# Patient Record
Sex: FEMALE | Race: BLACK OR AFRICAN AMERICAN | NOT HISPANIC OR LATINO | Employment: STUDENT | URBAN - METROPOLITAN AREA
[De-identification: names, ages, dates, MRNs, and addresses within clinical notes are randomized per-mention and may not be internally consistent; named-entity substitution may affect disease eponyms.]

---

## 2024-01-31 ENCOUNTER — HOSPITAL ENCOUNTER (EMERGENCY)
Facility: CLINIC | Age: 24
Discharge: HOME | End: 2024-02-01
Attending: EMERGENCY MEDICINE | Admitting: EMERGENCY MEDICINE
Payer: COMMERCIAL

## 2024-01-31 ENCOUNTER — NURSE TRIAGE (OUTPATIENT)
Dept: NURSING | Facility: CLINIC | Age: 24
End: 2024-01-31

## 2024-01-31 VITALS
DIASTOLIC BLOOD PRESSURE: 76 MMHG | RESPIRATION RATE: 16 BRPM | OXYGEN SATURATION: 100 % | TEMPERATURE: 97.7 F | SYSTOLIC BLOOD PRESSURE: 119 MMHG | HEART RATE: 94 BPM

## 2024-01-31 DIAGNOSIS — R31.9 URINARY TRACT INFECTION WITH HEMATURIA, SITE UNSPECIFIED: ICD-10-CM

## 2024-01-31 DIAGNOSIS — R31.9 HEMATURIA, UNSPECIFIED TYPE: ICD-10-CM

## 2024-01-31 DIAGNOSIS — N39.0 URINARY TRACT INFECTION WITH HEMATURIA, SITE UNSPECIFIED: ICD-10-CM

## 2024-01-31 LAB
ALBUMIN UR-MCNC: ABNORMAL G/DL
APPEARANCE UR: ABNORMAL
BILIRUB UR QL STRIP: ABNORMAL
COLOR UR AUTO: ABNORMAL
GLUCOSE UR STRIP-MCNC: ABNORMAL MG/DL
HGB UR QL STRIP: ABNORMAL
KETONES UR STRIP-MCNC: ABNORMAL MG/DL
LEUKOCYTE ESTERASE UR QL STRIP: ABNORMAL
NITRATE UR QL: ABNORMAL
PH UR STRIP: ABNORMAL [PH]
RBC URINE: >182 /HPF
SP GR UR STRIP: ABNORMAL
UROBILINOGEN UR STRIP-MCNC: ABNORMAL MG/DL
WBC URINE: >182 /HPF

## 2024-01-31 PROCEDURE — 99284 EMERGENCY DEPT VISIT MOD MDM: CPT | Performed by: EMERGENCY MEDICINE

## 2024-01-31 PROCEDURE — 81025 URINE PREGNANCY TEST: CPT | Performed by: EMERGENCY MEDICINE

## 2024-01-31 PROCEDURE — 81001 URINALYSIS AUTO W/SCOPE: CPT | Performed by: EMERGENCY MEDICINE

## 2024-01-31 PROCEDURE — 99283 EMERGENCY DEPT VISIT LOW MDM: CPT | Performed by: EMERGENCY MEDICINE

## 2024-01-31 NOTE — LETTER
February 1, 2024      To Whom It May Concern:      Teresa Gordon was seen in our Emergency Department today, 02/01/24.  I expect her condition to improve over the next 2 days.  She may return to work/school when improved.    Sincerely,        Sheree Jhaveri MD

## 2024-02-01 LAB
ANION GAP SERPL CALCULATED.3IONS-SCNC: 10 MMOL/L (ref 7–15)
BASOPHILS # BLD AUTO: 0.1 10E3/UL (ref 0–0.2)
BASOPHILS NFR BLD AUTO: 0 %
BUN SERPL-MCNC: 14.6 MG/DL (ref 6–20)
CALCIUM SERPL-MCNC: 9.2 MG/DL (ref 8.6–10)
CHLORIDE SERPL-SCNC: 105 MMOL/L (ref 98–107)
CREAT SERPL-MCNC: 0.91 MG/DL (ref 0.51–0.95)
DEPRECATED HCO3 PLAS-SCNC: 22 MMOL/L (ref 22–29)
EGFRCR SERPLBLD CKD-EPI 2021: >90 ML/MIN/1.73M2
EOSINOPHIL # BLD AUTO: 0.1 10E3/UL (ref 0–0.7)
EOSINOPHIL NFR BLD AUTO: 1 %
ERYTHROCYTE [DISTWIDTH] IN BLOOD BY AUTOMATED COUNT: 13.3 % (ref 10–15)
GLUCOSE SERPL-MCNC: 84 MG/DL (ref 70–99)
HCG UR QL: NEGATIVE
HCT VFR BLD AUTO: 38.6 % (ref 35–47)
HGB BLD-MCNC: 12.9 G/DL (ref 11.7–15.7)
IMM GRANULOCYTES # BLD: 0 10E3/UL
IMM GRANULOCYTES NFR BLD: 0 %
INR PPP: 0.96 (ref 0.85–1.15)
INTERNAL QC OK POCT: NORMAL
LYMPHOCYTES # BLD AUTO: 2.3 10E3/UL (ref 0.8–5.3)
LYMPHOCYTES NFR BLD AUTO: 20 %
MCH RBC QN AUTO: 29.7 PG (ref 26.5–33)
MCHC RBC AUTO-ENTMCNC: 33.4 G/DL (ref 31.5–36.5)
MCV RBC AUTO: 89 FL (ref 78–100)
MONOCYTES # BLD AUTO: 0.7 10E3/UL (ref 0–1.3)
MONOCYTES NFR BLD AUTO: 6 %
NEUTROPHILS # BLD AUTO: 8.8 10E3/UL (ref 1.6–8.3)
NEUTROPHILS NFR BLD AUTO: 73 %
NRBC # BLD AUTO: 0 10E3/UL
NRBC BLD AUTO-RTO: 0 /100
PLATELET # BLD AUTO: 250 10E3/UL (ref 150–450)
POCT KIT EXPIRATION DATE: NORMAL
POCT KIT LOT NUMBER: NORMAL
POTASSIUM SERPL-SCNC: 3.5 MMOL/L (ref 3.4–5.3)
RBC # BLD AUTO: 4.34 10E6/UL (ref 3.8–5.2)
SODIUM SERPL-SCNC: 137 MMOL/L (ref 135–145)
WBC # BLD AUTO: 12 10E3/UL (ref 4–11)

## 2024-02-01 PROCEDURE — 36415 COLL VENOUS BLD VENIPUNCTURE: CPT | Performed by: EMERGENCY MEDICINE

## 2024-02-01 PROCEDURE — 80048 BASIC METABOLIC PNL TOTAL CA: CPT | Performed by: EMERGENCY MEDICINE

## 2024-02-01 PROCEDURE — 250N000013 HC RX MED GY IP 250 OP 250 PS 637: Performed by: EMERGENCY MEDICINE

## 2024-02-01 PROCEDURE — 85610 PROTHROMBIN TIME: CPT | Performed by: EMERGENCY MEDICINE

## 2024-02-01 PROCEDURE — 85025 COMPLETE CBC W/AUTO DIFF WBC: CPT | Performed by: EMERGENCY MEDICINE

## 2024-02-01 RX ORDER — CEFPODOXIME PROXETIL 200 MG/1
200 TABLET, FILM COATED ORAL 2 TIMES DAILY
Qty: 14 TABLET | Refills: 0 | Status: SHIPPED | OUTPATIENT
Start: 2024-02-01

## 2024-02-01 RX ORDER — CEFPODOXIME PROXETIL 200 MG/1
200 TABLET, FILM COATED ORAL ONCE
Status: COMPLETED | OUTPATIENT
Start: 2024-02-01 | End: 2024-02-01

## 2024-02-01 RX ADMIN — CEFPODOXIME PROXETIL 200 MG: 200 TABLET, FILM COATED ORAL at 02:00

## 2024-02-01 ASSESSMENT — ACTIVITIES OF DAILY LIVING (ADL): ADLS_ACUITY_SCORE: 35

## 2024-02-01 NOTE — DISCHARGE INSTRUCTIONS
Your urine shows both blood cells and cells that show sign of infection.     You have a slight elevated White Blood cell count.     Your kidney function is normal.  Your electrolytes are normal.     Please take the antibiotics as prescribed.     Return to the ER if you develop back pain, fever, or vomiting.     Please make an appointment to follow up with Primary Care - [facility] (phone: [phone]) and Urology Clinic (phone: [phone]) in 2-3 days even if entirely better.

## 2024-02-01 NOTE — ED TRIAGE NOTES
Pt started having blood in urine two hours ago, pain with urination.     Triage Assessment (Adult)       Row Name 01/31/24 4584          Triage Assessment    Airway WDL WDL        Respiratory WDL    Respiratory WDL WDL        Skin Circulation/Temperature WDL    Skin Circulation/Temperature WDL WDL        Cardiac WDL    Cardiac WDL WDL        Peripheral/Neurovascular WDL    Peripheral Neurovascular WDL WDL        Cognitive/Neuro/Behavioral WDL    Cognitive/Neuro/Behavioral WDL WDL

## 2024-02-01 NOTE — ED PROVIDER NOTES
"ED Provider Note  [facility]      History     Chief Complaint   Patient presents with    Hematuria     HPI  Teresa Gordon is a 23 year old female who presents to the emergency department seeking evaluation of hematuria that onset around 8:30pm tonight. She is unaware of any possible triggers surrounding the onset of her hematuria. She states that before 8:30pm she had been urinating normally. She describes her urine as being \"pure red\". She is not sexually active. No vaginal discharge. She also notes experiencing burning after having voided her bladder as well as extreme urgency before urinating. She denies any abdominal or back pain. No recent medication changes, but she does state that she is on medication for frequent urination. She is unsure of what medication she takes. She notes being nauseous x1 week.      Past Medical History  No past medical history on file.  No past surgical history on file.  No current outpatient medications on file.    No Known Allergies  Family History  No family history on file.  Social History       Past medical history, past surgical history, medications, allergies, family history, and social history were reviewed with the patient. No additional pertinent items.      A medically appropriate review of systems was performed with pertinent positives and negatives noted in the HPI, and all other systems negative.    Physical Exam   BP: 119/76  Pulse: 94  Temp: 97.7  F (36.5  C)  Resp: 16  SpO2: 100 %  Physical Exam  Constitutional:       General: She is not in acute distress.     Appearance: She is well-developed. She is not ill-appearing, toxic-appearing or diaphoretic.   HENT:      Head: Normocephalic and atraumatic.   Cardiovascular:      Rate and Rhythm: Normal rate and regular rhythm.      Heart sounds: Normal heart sounds.   Pulmonary:      Effort: Pulmonary effort is normal. No respiratory distress.      Breath sounds: Normal breath " sounds.   Abdominal:      General: There is no distension.      Palpations: Abdomen is soft. There is no mass.      Tenderness: There is no abdominal tenderness. There is no rebound.      Hernia: No hernia is present.   Musculoskeletal:         General: No tenderness.      Cervical back: Normal range of motion.   Skin:     General: Skin is warm and dry.   Neurological:      Mental Status: She is alert and oriented to person, place, and time.   Psychiatric:         Behavior: Behavior normal.         Thought Content: Thought content normal.           ED Course, Procedures, & Data      Procedures       Results for orders placed or performed during the hospital encounter of 01/31/24   UA with Microscopic     Status: Abnormal   Result Value Ref Range    Color Urine Red (A) Colorless, Straw, Light Yellow, Yellow    Appearance Urine Bloody (A) Clear    Glucose Urine      Bilirubin Urine      Ketones Urine      Specific Gravity Urine      Blood Urine      pH Urine      Protein Albumin Urine      Urobilinogen Urine      Nitrite Urine      Leukocyte Esterase Urine      RBC Urine >182 (H) <=2 /HPF    WBC Urine >182 (H) <=5 /HPF   Basic metabolic panel     Status: Normal   Result Value Ref Range    Sodium 137 135 - 145 mmol/L    Potassium 3.5 3.4 - 5.3 mmol/L    Chloride 105 98 - 107 mmol/L    Carbon Dioxide (CO2) 22 22 - 29 mmol/L    Anion Gap 10 7 - 15 mmol/L    Urea Nitrogen 14.6 6.0 - 20.0 mg/dL    Creatinine 0.91 0.51 - 0.95 mg/dL    GFR Estimate >90 >60 mL/min/1.73m2    Calcium 9.2 8.6 - 10.0 mg/dL    Glucose 84 70 - 99 mg/dL   INR     Status: Normal   Result Value Ref Range    INR 0.96 0.85 - 1.15   CBC with platelets and differential     Status: Abnormal   Result Value Ref Range    WBC Count 12.0 (H) 4.0 - 11.0 10e3/uL    RBC Count 4.34 3.80 - 5.20 10e6/uL    Hemoglobin 12.9 11.7 - 15.7 g/dL    Hematocrit 38.6 35.0 - 47.0 %    MCV 89 78 - 100 fL    MCH 29.7 26.5 - 33.0 pg    MCHC 33.4 31.5 - 36.5 g/dL    RDW 13.3 10.0 -  15.0 %    Platelet Count 250 150 - 450 10e3/uL    % Neutrophils 73 %    % Lymphocytes 20 %    % Monocytes 6 %    % Eosinophils 1 %    % Basophils 0 %    % Immature Granulocytes 0 %    NRBCs per 100 WBC 0 <1 /100    Absolute Neutrophils 8.8 (H) 1.6 - 8.3 10e3/uL    Absolute Lymphocytes 2.3 0.8 - 5.3 10e3/uL    Absolute Monocytes 0.7 0.0 - 1.3 10e3/uL    Absolute Eosinophils 0.1 0.0 - 0.7 10e3/uL    Absolute Basophils 0.1 0.0 - 0.2 10e3/uL    Absolute Immature Granulocytes 0.0 <=0.4 10e3/uL    Absolute NRBCs 0.0 10e3/uL   hCG qual urine POCT     Status: Normal   Result Value Ref Range    HCG Qual Urine Negative Negative    Internal QC Check POCT Valid Valid    POCT Kit Lot Number [id number]     POCT Kit Expiration Date 2025-07-27    CBC with platelets differential     Status: Abnormal    Narrative    The following orders were created for panel order CBC with platelets differential.  Procedure                               Abnormality         Status                     ---------                               -----------         ------                     CBC with platelets and d...[[id number]]  Abnormal            Final result                 Please view results for these tests on the individual orders.     Medications   cefpodoxime (VANTIN) tablet 200 mg (has no administration in time range)                    Results for orders placed or performed during the hospital encounter of 01/31/24   UA with Microscopic     Status: Abnormal   Result Value Ref Range    Color Urine Red (A) Colorless, Straw, Light Yellow, Yellow    Appearance Urine Bloody (A) Clear    Glucose Urine      Bilirubin Urine      Ketones Urine      Specific Gravity Urine      Blood Urine      pH Urine      Protein Albumin Urine      Urobilinogen Urine      Nitrite Urine      Leukocyte Esterase Urine      RBC Urine >182 (H) <=2 /HPF    WBC Urine >182 (H) <=5 /HPF   CBC with platelets differential     Status: None (In process)    Narrative    The following  orders were created for panel order CBC with platelets differential.  Procedure                               Abnormality         Status                     ---------                               -----------         ------                     CBC with platelets and d...[[id number]]                      In process                   Please view results for these tests on the individual orders.     Medications - No data to display  Labs Ordered and Resulted from Time of ED Arrival to Time of ED Departure   ROUTINE UA WITH MICROSCOPIC - Abnormal       Result Value    Color Urine Red (*)     Appearance Urine Bloody (*)     Glucose Urine        Bilirubin Urine        Ketones Urine        Specific Gravity Urine        Blood Urine        pH Urine        Protein Albumin Urine        Urobilinogen Urine        Nitrite Urine        Leukocyte Esterase Urine        RBC Urine >182 (*)     WBC Urine >182 (*)    BASIC METABOLIC PANEL   INR   CBC WITH PLATELETS AND DIFFERENTIAL   HCG QUALITATIVE URINE POCT     No orders to display          Critical care was not performed.     Medical Decision Making  The patient's presentation was of moderate complexity (an undiagnosed new problem with uncertain diagnosis).    The patient's evaluation involved:  ordering and/or review of 2 test(s) in this encounter (cbc, cmp, UA, UCG)    The patient's management necessitated moderate risk (prescription drug management including medications given in the ED).    Assessment & Plan    Patient is a young 23-year-old female with a history of some urinary urgency that she takes medications at home for presents to the ER due to hematuria.  Symptoms started this evening.  Patient here is a UA that shows positive RBCs and positive WBCs.  Patient has a mild leukocytosis.  Remainder of labs including kidney function platelets and hemoglobin are stable.  Recommended to treat for a UTI as this seems to be the likely cause of this worsening symptoms.  Patient has  no signs of pyelonephritis.  She will be given her first oral dose of antibiotics here and will be discharged home with a 7-day course.  Patient to follow-up with PCP and urology as an outpatient if symptoms persist.    I have reviewed the nursing notes. I have reviewed the findings, diagnosis, plan and need for follow up with the patient.    New Prescriptions    No medications on file       Final diagnoses:   Hematuria, unspecified type   Urinary tract infection with hematuria, site unspecified   I, Agustin Hodgson, am serving as a trained medical scribe to document services personally performed by Sheree Jhaveri MD, based on the provider's statements to me.     I, Sheree Jhaveri MD, was physically present and have reviewed and verified the accuracy of this note documented by Agustin Hodgson.      Sheree Jhaveri MD  [facility] EMERGENCY DEPARTMENT  1/31/2024     Sheree Jhaveri MD  02/01/24 0139

## 2024-02-01 NOTE — TELEPHONE ENCOUNTER
"Nurse Triage SBAR    Is this a 2nd Level Triage? NO    Situation: Hematuria    Background: :na    Assessment: Patient reports that she has voided \"pure blood X2\" this evening. She reports a strong urge to urinate, but only able to void a few drops other than the blood. She denies severe weakness, dizziness, or injury to abdomen/back/genitals.    Protocol Recommended Disposition:   According to the protocol, patient should go to ED now.  Care advice given. Patient verbalizes understanding and agrees with plan of care. Patient plans to go to [place] ED.    Priscila Boykin RN  01/31/24 10:39 PM  [facility] Nurse Advisor    Reason for Disposition   [1] Unable to urinate (or only a few drops) > 4 hours AND [2] bladder feels very full (e.g., palpable bladder or strong urge to urinate)    Additional Information   Negative: Shock suspected (e.g., cold/pale/clammy skin, too weak to stand, low BP, rapid pulse)   Negative: Sounds like a life-threatening emergency to the triager   Negative: Urinary catheter, questions about   Negative: Urinary catheter and spinal cord injury, questions about   Negative: Hospice patient with urinary catheter   Negative: Recent back or abdominal injury   Negative: Recent genital injury    Protocols used: Urine - Blood In-A-    "

## 2024-09-07 ENCOUNTER — HOSPITAL ENCOUNTER (EMERGENCY)
Facility: CLINIC | Age: 24
Discharge: HOME | End: 2024-09-07
Attending: EMERGENCY MEDICINE | Admitting: EMERGENCY MEDICINE
Payer: COMMERCIAL

## 2024-09-07 VITALS
RESPIRATION RATE: 16 BRPM | DIASTOLIC BLOOD PRESSURE: 68 MMHG | SYSTOLIC BLOOD PRESSURE: 100 MMHG | TEMPERATURE: 98.4 F | HEART RATE: 95 BPM | OXYGEN SATURATION: 99 %

## 2024-09-07 DIAGNOSIS — R45.851 SUICIDAL IDEATION: ICD-10-CM

## 2024-09-07 PROBLEM — F41.1 GAD (GENERALIZED ANXIETY DISORDER): Status: ACTIVE | Noted: 2024-09-07

## 2024-09-07 PROBLEM — F32.A DEPRESSION, UNSPECIFIED DEPRESSION TYPE: Status: ACTIVE | Noted: 2024-09-07

## 2024-09-07 LAB — ALCOHOL BREATH TEST: 0.15 (ref 0–0.01)

## 2024-09-07 PROCEDURE — 99283 EMERGENCY DEPT VISIT LOW MDM: CPT | Performed by: EMERGENCY MEDICINE

## 2024-09-07 PROCEDURE — 82075 ASSAY OF BREATH ETHANOL: CPT | Performed by: EMERGENCY MEDICINE

## 2024-09-07 PROCEDURE — 99284 EMERGENCY DEPT VISIT MOD MDM: CPT | Performed by: EMERGENCY MEDICINE

## 2024-09-07 ASSESSMENT — ACTIVITIES OF DAILY LIVING (ADL)
ADLS_ACUITY_SCORE: 35

## 2024-09-07 ASSESSMENT — COLUMBIA-SUICIDE SEVERITY RATING SCALE - C-SSRS
1. IN THE PAST MONTH, HAVE YOU WISHED YOU WERE DEAD OR WISHED YOU COULD GO TO SLEEP AND NOT WAKE UP?: YES
5. HAVE YOU STARTED TO WORK OUT OR WORKED OUT THE DETAILS OF HOW TO KILL YOURSELF? DO YOU INTEND TO CARRY OUT THIS PLAN?: YES
2. HAVE YOU ACTUALLY HAD ANY THOUGHTS OF KILLING YOURSELF IN THE PAST MONTH?: YES
3. HAVE YOU BEEN THINKING ABOUT HOW YOU MIGHT KILL YOURSELF?: YES
6. HAVE YOU EVER DONE ANYTHING, STARTED TO DO ANYTHING, OR PREPARED TO DO ANYTHING TO END YOUR LIFE?: NO
4. HAVE YOU HAD THESE THOUGHTS AND HAD SOME INTENTION OF ACTING ON THEM?: NO

## 2024-09-07 NOTE — ED TRIAGE NOTES
To ED via EMS for evaluation of SI. Pt has hx of depression on Zoloft. Endorses ETOH use tonight. States she was having thoughts of taking her entire bottle of Zoloft.      Triage Assessment (Adult)       Row Name 09/07/24 0027          Triage Assessment    Airway WDL WDL        Respiratory WDL    Respiratory WDL WDL        Skin Circulation/Temperature WDL    Skin Circulation/Temperature WDL WDL        Cardiac WDL    Cardiac WDL WDL

## 2024-09-07 NOTE — DISCHARGE INSTRUCTIONS
Appointments    Date: Wednesday, 9/11/2024  Time: 4:00 pm - 4:55 pm  Provider: Elle Weaver MA  [facility]  Location: [facility], [address] [place] [address]  Phone: [phone]  Type: Teletherapy    Patient Instructions  Telehealth/Virtual services only. No in-person services. New patients are contacted via phone/email by CDS office managers (to confirm information), before 1st appointment. Electronic device w/video capabilities required for services. New patients have e-paperwork to complete prior to 1st appointment (access to e-docs given by CDS  during intake). Call CDS w/questions [phone] or email [email or web address]      It is your responsibility to contact your insurance company directly to verify coverage, eligibility, payment, and benefit information for any appointments or referrals listed above.    [facility] maintains an extensive network of licensed behavioral health providers to connect patients with the services they need. We do not charge providers a fee to participate in our referral network. We match patients with providers based on a patient's specific treatment needs, insurance coverage, and location. Our first effort will be to refer you to a provider within your care system and will utilize providers outside your care system as needed.

## 2024-09-07 NOTE — ED PROVIDER NOTES
[place] EMERGENCY DEPARTMENT ([facility])    9/07/24         History     Chief Complaint   Patient presents with    Suicidal     HPI  Teresa Gordon is a 23 year old female who with no relevant PMH as per Epic presents to the ED due to suicidal ideation.     As per triage note, patient presents to ED via EMS for evaluation of SI. Patient has hx of depression on Zoloft. She endorses ETOH use tonight. She states she was having thoughts of taking an entire bottle of Zoloft.  She has not tried to hurt herself in the past but is thought about it.  She also thought about jumping off a bridge.  No hallucinations or voices.  She had 5 or 6 drinks tonight.  Denies other drug use.    Past Medical History  No past medical history on file.  No past surgical history on file.  cefpodoxime (VANTIN) 200 MG tablet      No Known Allergies  Family History  No family history on file.  Social History       Past medical history, past surgical history, medications, allergies, family history, and social history were reviewed with the patient. No additional pertinent items.   A complete review of systems was performed with pertinent positives and negatives noted in the HPI, and all other systems negative.    Physical Exam      Physical Exam  Physical Exam   Constitutional: oriented to person, place, and time. appears well-developed and well-nourished.   HENT:   Head: Normocephalic and atraumatic.   Neck: Normal range of motion.   Pulmonary/Chest: Effort normal. No respiratory distress.   Cardiac: No murmurs, rubs, gallops. RRR.  Abdominal: Abdomen soft, nontender, nondistended. No rebound tenderness.  MSK: Long bones without deformity or evidence of trauma  Neurological: alert and oriented to person, place, and time.   Skin: Skin is warm and dry.   Psychiatric:  normal mood and affect.  behavior is normal. Thought content normal.       ED Course, Procedures, & Data      Procedures            No results found for any visits on  09/07/24.  Medications - No data to display  Labs Ordered and Resulted from Time of ED Arrival to Time of ED Departure - No data to display  No orders to display          Critical care was not performed.     Medical Decision Making  The patient's presentation was of high complexity (an acute health issue posing potential threat to life or bodily function).    The patient's evaluation involved:  independent interpretation of testing performed by another health professional (mental health )    The patient's management necessitated further care after sign-out to Dr. Martinez (see their note for further management).    Assessment & Plan    MDM  Patient here without intoxication and suicidal ideation with a plan.  Will have behavioral assessment done prior to final disposition.  I would hold this patient until having an assessment done.  She will likely need to sober prior to an assessment.    I have reviewed the nursing notes. I have reviewed the findings, diagnosis, plan and need for follow up with the patient.    New Prescriptions    No medications on file       Final diagnoses:   Suicidal ideation       Damian Patel  [facility] EMERGENCY DEPARTMENT  9/7/2024     Damian Patel MD  09/07/24 0046

## 2024-09-07 NOTE — ED PROVIDER NOTES
Dr. Naomi Rock notified of positive MSSA, order received for mupirocin x 1 in pre-op. Patient received in sign-out from prior attending     Teresa BORJAS Heidi is a 23 year old who presenting to the ED for SI- thoughts of jumping of bidge    Plan is to await DEC evaluation     DEV laury called in to me at 450     - multiple social stressors.  Concerned she is inconvenient to her friends.   - takes zoloft, compliant  - denies other drugs except occasional etoh   - open and interested in therapy,   - currently denies SI, but endorses prior thoughts.   - able to contract for safety.       Dispo - putpt followup plan with therapy scheduled as per AVS  SERP given and discussed.     /76   Pulse 95   Temp 98.2  F (36.8  C) (Oral)   Resp 16   SpO2 98%     Patient has been HDS without issue since sign-out received.          Isaac Martinez MD  09/07/24 1854

## 2024-09-07 NOTE — CONSULTS
"Diagnostic Evaluation Consultation  Crisis Assessment    Patient Name: Teresa Gordon  Age:  23 year old  Legal Sex: female  Gender Identity: female  Pronouns:   Race: Black or   Ethnicity: Not  or   Language: English      Patient was assessed: Virtual: [employer]   Crisis Assessment Start Date: 09/07/24  Crisis Assessment Start Time: 0415  Crisis Assessment Stop Time: 0448  Patient location: [facility] EMERGENCY DEPARTMENT                             [id number]    Referral Data and Chief Complaint  Teresa Gordon presents to the ED via EMS. Patient is presenting to the ED for the following concerns: Worsening psychosocial stress, Suicidal ideation, Depression.   Factors that make the mental health crisis life threatening or complex are:  Pt struggling to feel connected to her support system and ongoing issues with feelings of negative self worth..      Informed Consent and Assessment Methods  Explained the crisis assessment process, including applicable information disclosures and limits to confidentiality, assessed understanding of the process, and obtained consent to proceed with the assessment.  Assessment methods included conducting a formal interview with patient, review of medical records, collaboration with medical staff, and obtaining relevant collateral information from family and community providers when available.  : done     Patient response to interventions: acceptance expressed, verbalizes understanding  Coping skills were attempted to reduce the crisis:  pt reaching out to friend for support     History of the Crisis   Patient is a 23-year-old female with a history of depression and anxiety who comes in the hospital brought in by ambulance due to concerns of suicidal ideation.  Patient states that lately she \"has been having a hard time\" feeling that it has been difficult for her to feel connected to her friend group and wishing to have more support from them but " "at times feeling as though they are not available.  Patient was drinking yesterday and began to develop thoughts of suicide including a plan to jump off a bridge or overdose on her medication.  She ended up sharing this information with a friend who then called 911 for the patient. Upon coming to the ED pt had a ALTHEA of .152.    Patient states that while in the hospital she feels \"a bit better\" and denies any current plans of suicide at this time.  She feels that often in life she does not feel good enough where she continues to experience depressive symptoms including feeling helpless, becoming tearful, sadness and reduced energy levels.  She also reports struggling with anxiety \"my whole life\" where she finds herself worried about everything and often over thinking things.  She feels that she gets adequate support from her family and has been trying to be more open and honest with them moving forward.    Brief Psychosocial History  Family:  Single, Children no  Support System:  Friend, Parent(s)  Employment Status:  unemployed  Source of Income:  none  Financial Environmental Concerns:  none  Current Hobbies:  group/social activities, television/movies/videos  Barriers in Personal Life:       Significant Clinical History  Current Anxiety Symptoms:  excessive worry, racing thoughts, anxious  Current Depression/Trauma:  sense of doom, crying or feels like crying, hopelessness, sadness, helplessness, thoughts of death/suicide, low self esteem  Current Somatic Symptoms:  excessive worry, anxious, racing thoughts  Current Psychosis/Thought Disturbance:     Current Eating Symptoms:  loss of appetite  Chemical Use History:  Alcohol: Other (comments) (drank twice this past week; prior to this has not drank all summer)  Last Use:: 09/06/24  Benzodiazepines: None  Opiates: None  Cocaine: None  Marijuana: None  Other Use: None   Past diagnosis:  Depression, Anxiety Disorder  Family history:  Depression  Past treatment:  " Individual therapy, Primary Care  Details of most recent treatment:  Pt currently not established with any outpatient providers. Last saw a therapist in August 2024. Pt is uncertain as to why she stopped going. Pt has no previous inpatient admissions.  Other relevant history:          Collateral Information  Is there collateral information: No     Collateral information name, relationship, phone number:       What happened today:       What is different about patient's functioning:       Concern about alcohol/drug use:      What do you think the patient needs:      Has patient made comments about wanting to kill themselves/others:      If d/c is recommended, can they take part in safety/aftercare planning:       Additional collateral information:        Risk Assessment  [place] Suicide Severity Rating Scale Full Clinical Version:  Suicidal Ideation  Q1 Wish to be Dead (Lifetime): Yes  Q2 Non-Specific Active Suicidal Thoughts (Lifetime): Yes  3. Active Suicidal Ideation with any Methods (Not Plan) Without Intent to Act (Lifetime): Yes  Q4 Active Suicidal Ideation with Some Intent to Act, Without Specific Plan (Lifetime): No  Q5 Active Suicidal Ideation with Specific Plan and Intent (Lifetime): No  Q6 Suicide Behavior (Lifetime): no     Suicidal Behavior (Lifetime)  Actual Attempt (Lifetime): No  Has subject engaged in non-suicidal self-injurious behavior? (Lifetime): No  Interrupted Attempts (Lifetime): No  Aborted or Self-Interrupted Attempt (Lifetime): No  Preparatory Acts or Behavior (Lifetime): No    [place] Suicide Severity Rating Scale Recent:   Suicidal Ideation (Recent)  Q1 Wished to be Dead (Past Month): yes  Q2 Suicidal Thoughts (Past Month): yes  Q3 Suicidal Thought Method: yes  Q4 Suicidal Intent without Specific Plan: no  Q5 Suicide Intent with Specific Plan: no  Level of Risk per Screen: moderate risk  Intensity of Ideation (Recent)  Most Severe Ideation Rating (Past 1 Month): 4  Frequency (Past 1  Month): Daily or almost daily  Duration (Past 1 Month): 1-4 hours/a lot of time  Controllability (Past 1 Month): Can control thoughts with a lot of difficulty  Deterrents (Past 1 Month): Deterrents definitely did not stop you  Reasons for Ideation (Past 1 Month): Completely to end or stop the pain (You couldn't go on living with the pain or how you were feeling)  Suicidal Behavior (Recent)  Actual Attempt (Past 3 Months): No  Has subject engaged in non-suicidal self-injurious behavior? (Past 3 Months): No  Interrupted Attempts (Past 3 Months): No  Aborted or Self-Interrupted Attempt (Past 3 Months): No  Preparatory Acts or Behavior (Past 3 Months): No    Environmental or Psychosocial Events: unemployment/underemployment, helplessness/hopelessness, challenging interpersonal relationships  Protective Factors: Protective Factors: lives in a responsibly safe and stable environment, sense of importance of health and wellness    Does the patient have thoughts of harming others? Feels Like Hurting Others: no  Previous Attempt to Hurt Others: no  Is the patient engaging in sexually inappropriate behavior?: no    Is the patient engaging in sexually inappropriate behavior?  no        Mental Status Exam   Affect: Blunted  Appearance: Appropriate  Attention Span/Concentration: Attentive  Eye Contact: Engaged    Fund of Knowledge: Appropriate   Language /Speech Content: Fluent  Language /Speech Volume: Normal  Language /Speech Rate/Productions: Normal  Recent Memory: Intact  Remote Memory: Intact  Mood: Normal  Orientation to Person: Yes   Orientation to Place: Yes  Orientation to Time of Day: Yes  Orientation to Date: Yes     Situation (Do they understand why they are here?): Yes  Psychomotor Behavior: Normal  Thought Content: Clear, Other (please comment) (passive Si, no plan or intent)  Thought Form: Intact     Mini-Cog Assessment  Number of Words Recalled:    Clock-Drawing Test:     Three Item Recall:    Mini-Cog Total  Score:       Medication  Psychotropic medications:   Medication Orders - Psychiatric (From admission, onward)      None             Current Care Team  Patient Care Team:  No Ref-Primary, Physician as PCP - General    Diagnosis  Patient Active Problem List   Diagnosis Code    Depression, unspecified depression type F32.A    BRIELLE (generalized anxiety disorder) F41.1       Primary Problem This Admission  Active Hospital Problems    Depression, unspecified depression type      BRIELLE (generalized anxiety disorder)        Clinical Summary and Substantiation of Recommendations   Patient comes into the ED due to concerns of suicidal ideation with verbalizing yesterday having thoughts of jumping off a bridge or overdosing on medication.  Patient believes that alcohol was somewhat to blame for these statements and currently denies any plans for suicide.  Patient feels that life as a whole has been difficult including navigating social relationships with her friend group and trying to balance being a student in college.  Patient is currently unemployed and feels that she also needs to put forth more effort in school.  She feels that she lacks a sense of direction and at times struggling with feelings of worthlessness.  At times patient had difficulty articulating specific concerns that she was experiencing when  was trying to probe for further information.  Patient was open and willing to follow up with therapy along with resuming her Zoloft medication.  Patient feels that they are able to maintain safety upon discharge from the hospital.  Discussed case with attending doctor who is in agreement with plan.            Patient coping skills attempted to reduce the crisis:  pt reaching out to friend for support    Disposition  Recommended disposition: Individual Therapy        Reviewed case and recommendations with attending provider. Attending Name: Dr. Martinez       Attending concurs with disposition: yes       Patient  and/or validated legal guardian concurs with disposition:   yes       Final disposition:  discharge    Legal status on admission:      Assessment Details   Total duration spent with the patient: 35 min     CPT code(s) utilized: 85153 - Psychotherapy for Crisis - 60 (30-74*) min    Zion Jaquez Psychotherapist  [facility] - Triage & Transition Services  Callback: [phone]

## 2024-09-08 ENCOUNTER — TELEPHONE (OUTPATIENT)
Dept: BEHAVIORAL HEALTH | Facility: CLINIC | Age: 24
End: 2024-09-08
Payer: COMMERCIAL